# Patient Record
Sex: MALE | Race: WHITE | NOT HISPANIC OR LATINO
[De-identification: names, ages, dates, MRNs, and addresses within clinical notes are randomized per-mention and may not be internally consistent; named-entity substitution may affect disease eponyms.]

---

## 2020-12-07 ENCOUNTER — TRANSCRIPTION ENCOUNTER (OUTPATIENT)
Age: 20
End: 2020-12-07

## 2023-05-23 PROBLEM — Z00.00 ENCOUNTER FOR PREVENTIVE HEALTH EXAMINATION: Status: ACTIVE | Noted: 2023-05-23

## 2023-05-31 ENCOUNTER — APPOINTMENT (OUTPATIENT)
Dept: ORTHOPEDIC SURGERY | Facility: CLINIC | Age: 23
End: 2023-05-31
Payer: COMMERCIAL

## 2023-05-31 VITALS — HEIGHT: 67 IN | BODY MASS INDEX: 26.68 KG/M2 | WEIGHT: 170 LBS

## 2023-05-31 DIAGNOSIS — Z78.9 OTHER SPECIFIED HEALTH STATUS: ICD-10-CM

## 2023-05-31 DIAGNOSIS — M54.16 RADICULOPATHY, LUMBAR REGION: ICD-10-CM

## 2023-05-31 DIAGNOSIS — M51.26 OTHER INTERVERTEBRAL DISC DISPLACEMENT, LUMBAR REGION: ICD-10-CM

## 2023-05-31 PROCEDURE — 99204 OFFICE O/P NEW MOD 45 MIN: CPT

## 2023-05-31 NOTE — DATA REVIEWED
[MRI] : MRI [Lumbar Spine] : lumbar spine [I independently reviewed and interpreted images and report] : I independently reviewed and interpreted images and report [Report was reviewed and noted in the chart] : The report was reviewed and noted in the chart [I reviewed the films/CD and additionally noted] : I reviewed the films/CD and additionally noted [FreeTextEntry1] : I stop paperwork reviewed

## 2023-05-31 NOTE — HISTORY OF PRESENT ILLNESS
[de-identified] : 05/31/2023 - 22 y/o M presenting for an initial evaluation of L Spine. Chronic hx of spine issues - he reports HNP starting at the age of 6. Present day, he is here for a new pain starting on 5/7/23.Patient reports increased back pain starting while running during a sports game. No trauma. SInce onset, he has been relatively asymptomatic after he underwent recent MARIA DOLORES at L5S1. At present he is not using medications for pain. When symptoms were present, patient reports weakness and pain throughout the b/l lower extremities. Pain was more prominent in the b/l thighs and calves. Pain was worse while standing and walking. Pain tolerable sitting. Presents with recent MRI for review. No bowel/bladder dysfunction. General medical health is good. \par \par \par   [FreeTextEntry5] : The patient is a 23 year old male who presents today complaining of  Lumbar spine.\par Date of Injury/Onset: 5/7/23\par Pain:    At Rest: _/10  \par With Activity:  _/10  \par Mechanism of injury: Pt reports having herniated disc since 12 years old. Pt reports being asymptomatic until now. C/o tingling and numbness on b/l legs and extremities.\par Quality of symptoms: Sharp,\par Improves with: Epidoural\par Worse with: Getting up and walking, lying down\par Prior treatment/ Imaging: Epidoural Dr. Headley (JASMIN Fernández MD) - MRI @  \par Out of work: ;Since: \par School/Sport/Position/Occupation: \par Additional Information: None\par \par

## 2023-05-31 NOTE — PHYSICAL EXAM
[Normal Coordination] : normal coordination [Normal DTR UE/LE] : normal DTR UE/LE  [Normal Sensation] : normal sensation [Normal Mood and Affect] : normal mood and affect [Orientated] : orientated [Able to Communicate] : able to communicate [Normal Skin] : normal skin [No Rash] : no rash [No Ulcers] : no ulcers [No Lesions] : no lesions [No obvious lymphadenopathy in areas examined] : no obvious lymphadenopathy in areas examined [Well Developed] : well developed [Peripheral vascular exam is grossly normal] : peripheral vascular exam is grossly normal [No Respiratory Distress] : no respiratory distress [de-identified] : Constitutional:\par - General Appearance:\par Unremarkable\par Body Habitus\par Well Developed\par Well Nourished\par Body Habitus\par No Deformities\par Well Groomed\par Ability To communicate:\par Normal\par Neurologic:\par Global sensation is intact to upper and lower extremities. See examination of Neck and/or Spine\par for exceptions.\par Orientation to Time, Place and Person is: Normal\par Mood And Affect is Normal\par Skin:\par - Head/Face, Right Upper/Lower Extremity, Left Upper/Lower Extremity: Normal\par See Examination of Neck and/or Spine for exceptions\par Cardiovascular:\par Peripheral Cardiovascular System is Normal\par Palpation of Lymph Nodes:\par Normal Palpation of lymph nodes in: Axilla, Cervical, Inguinal\par Abnormal Palpation of lymph nodes in: None  [] : non-antalgic [de-identified] : Reflexes intact

## 2023-05-31 NOTE — DISCUSSION/SUMMARY
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 Patient is a surgical candidate facetectomy/fusion if he is refractory to nonoperative treatments and symptoms are functionally incapacitating. Not a candidate for microdiscectomy given this is conus level. Discussed possible laminectomy, facetectomy, discectomy and fusion L1-2 if refractory to non operative mgmt or develops neurologic deficit or symptoms suggestive of cauda equina syndrome. Patient is relatively asymptomatic after L5S1 MARIA DOLORES performed a week ago. Given there are no neurologic deficits, I advised the patient to continue non operative treatments before pursuing any surgical intervention. Advised patient to look out for any new or worsening symptoms in terms of bowel and bladder changes, or progression of motor or neurological deficits. \par F/U 3/4 WKS\par \par Prior to appointment and during encounter with patient extensive medical records were reviewed including but not limited to, hospital records, outpatient records, imaging results, and lab data.During this appointment the patient was examined, diagnoses were discussed and explained in a face to face manner. In addition extensive time was spent reviewing aforementioned diagnostic studies. Counseling including abnormal image results, differential diagnoses, treatment options, risk and benefits, lifestyle changes, current condition, and current medications was performed. Patient's comments, questions, and concerns were addressed and patient verbalized understanding. Based on this patient's presentation at our office, which is an orthopedic spine surgeon's office, this patient inherently / intrinsically has a risk, however minute, of developing issues such as Cauda equina syndrome, bowel and bladder changes, or progression of motor or neurological deficits such as paralysis which may be permanent.\par \par YAMILETH HUERTA Acting as a Scribe for Dr. Carlton\francisco SPRINGER, Yamileth Huerta, attest that this documentation has been prepared under the direction and in the presence of Provider Tal Carlton MD.

## 2024-01-22 ENCOUNTER — OFFICE (OUTPATIENT)
Dept: URBAN - METROPOLITAN AREA CLINIC 103 | Facility: CLINIC | Age: 24
Setting detail: OPHTHALMOLOGY
End: 2024-01-22
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
## 2024-01-22 PROCEDURE — NO SHOW FE NO SHOW FEE: Performed by: OPHTHALMOLOGY
